# Patient Record
(demographics unavailable — no encounter records)

---

## 2024-12-18 NOTE — HISTORY OF PRESENT ILLNESS
[FreeTextEntry1] : The patient is here for a routine gynecological examination with Pap smear.  Her LMP was 2 weeks ago  She stopped the pill three months ago due to increasing headaches.     She has no recent gynecological or urinary problems

## 2024-12-18 NOTE — PHYSICAL EXAM
[Chaperone Present] : A chaperone was present in the examining room during all aspects of the physical examination [[id number]] : A chaperone was present during the pelvic exam. [Appropriately responsive] : appropriately responsive [Alert] : alert [No Acute Distress] : no acute distress [No Lymphadenopathy] : no lymphadenopathy [Regular Rate Rhythm] : regular rate rhythm [No Murmurs] : no murmurs [Clear to Auscultation B/L] : clear to auscultation bilaterally [Soft] : soft [Non-tender] : non-tender [Non-distended] : non-distended [No HSM] : No HSM [No Lesions] : no lesions [No Mass] : no mass [Oriented x3] : oriented x3 [Labia Majora] : normal [Labia Minora] : normal [Normal] : normal [Uterine Adnexae] : normal

## 2025-02-20 NOTE — REASON FOR VISIT
[FreeTextEntry2] : New Patient-left knee pain with a tear sound when bending her knee  she has a known osteochondroma

## 2025-02-20 NOTE — PHYSICAL EXAM
[Left] : left knee [NL (0)] : extension 0 degrees [5___] : hamstring 5[unfilled]/5 [] : no atrophy [FreeTextEntry8] : osteochondroma palpated medial proximal tibia  [TWNoteComboBox7] : flexion 125 degrees

## 2025-02-20 NOTE — HISTORY OF PRESENT ILLNESS
[7] : 7 [0] : 0 [Dull/Aching] : dull/aching [Constant] : constant [Leisure] : leisure [Full time] : Work status: full time [de-identified] : Left knee pain that has been progressively getting worse for the past year. No new injury. [] : no [FreeTextEntry1] : e [FreeTextEntry9] : stretching [de-identified] : driving

## 2025-02-20 NOTE — DISCUSSION/SUMMARY
[de-identified] : General Dx Discussion The patient was advised of the diagnosis. The natural history of the pathology was explained in full to the patient in layman's terms. All questions were answered. The risks and benefits of surgical and non-surgical treatment alternatives were explained in full to the patient.  Case Discussed. Will get an mri for further evaluation of plica/osteochondroma. .  Also recommend Mobic.  Advised not to take any other nsaids while taking. f/u after mri.   Patient was given a prescription for an anti-inflammatory medication.  They will take it for the next 5-7 days and then on an as needed basis, as long as there are no medical contra-indications.  Patient is counseled on possible GI and blood pressure side effects.    Entered by Susan COOPER acting as a scribe. Instructions: Dr. Castillo- The documentation recorded by the scribe accurately reflects the service I personally performed and the decisions made by me.

## 2025-02-20 NOTE — IMAGING
[Right] : right knee [All Views] : anteroposterior, lateral, skyline, and anteroposterior standing [FreeTextEntry9] : osteochondroma proximal medial tibia

## 2025-02-27 NOTE — HISTORY OF PRESENT ILLNESS
[7] : 7 [0] : 0 [Dull/Aching] : dull/aching [Constant] : constant [Leisure] : leisure [Full time] : Work status: full time [de-identified] : 02/27/2025: Patient is here for MRI results of her left knee. She feels the same.  Some relief with mobic.  [] : no [FreeTextEntry1] : left knee [FreeTextEntry9] : stretching [de-identified] : driving [de-identified] : Dr. Castillo

## 2025-02-27 NOTE — DISCUSSION/SUMMARY
[de-identified] : General Dx Discussion The patient was advised of the diagnosis. The natural history of the pathology was explained in full to the patient in layman's terms. All questions were answered. The risks and benefits of surgical and non-surgical treatment alternatives were explained in full to the patient.  Case Discussed. MRI reviewed in detail with patient. Would recommend surgical excision at this time.  Therefore will refer to Dr. Mckniney for further evaluation and discussion of surgery.    Entered by Susan COOPER acting as a scribe. Instructions: Dr. Castillo- The documentation recorded by the scribe accurately reflects the service I personally performed and the decisions made by me.

## 2025-02-27 NOTE — DATA REVIEWED
[MRI] : MRI [Left] : left [Knee] : knee [Report was reviewed and noted in the chart] : The report was reviewed and noted in the chart [FreeTextEntry1] : mild patchy marrow edema throughout a focal area of bony exostosis in the peripheral medial tibial metaphysis measuring 2.6cm at the insertion of the distal medial collateral ligament without surrounding soft tissue mass. no meniscal tear, chondral defect or loose body.

## 2025-03-26 NOTE — BEGINNING OF VISIT
Problem: At Risk for Falls  Goal: # Patient does not fall  Outcome: Outcome Not Met, Continue to Monitor  Goal: # Takes action to control fall-related risks  Outcome: Outcome Not Met, Continue to Monitor  Goal: # Verbalizes understanding of fall risk/precautions  Description: Document education using the patient education activity  Outcome: Outcome Not Met, Continue to Monitor     Problem: At Risk for Injury Due to Fall  Goal: # Patient does not fall  Outcome: Outcome Not Met, Continue to Monitor  Goal: # Takes action to control condition specific risks  Outcome: Outcome Not Met, Continue to Monitor  Goal: # Verbalizes understanding of fall-related injury personal risks  Description: Document education using the patient education activity  Outcome: Outcome Not Met, Continue to Monitor     Problem: Self Care Deficit  Goal: # Functional status is maintained or returned to baseline - REHAB ONLY  Outcome: Outcome Not Met, Continue to Monitor  Goal: Functional status is maintained or returned to baseline  Outcome: Outcome Not Met, Continue to Monitor  Goal: # Tolerates activity for d/c setting with no clinical problems  Outcome: Outcome Not Met, Continue to Monitor     Problem: Impaired Physical Mobility  Goal: # Bed mobility, ambulation, and ADLs are maintained or returned to baseline during hospitalization  Outcome: Outcome Not Met, Continue to Monitor      [Patient] : patient

## 2025-03-27 NOTE — CONSULT LETTER
[Dear  ___] : Dear  [unfilled], [FreeTextEntry2] : Dr. Skip Castillo [address] [facility], [address] [place] [address] [FreeTextEntry1] : After evaluating your patient and reviewing the studies presented we have come to a mutually agreeable plan.   Please see my note below.  Should you have further questions, please feel free to call and discuss the care of your patient.  Thank you for the confidence of your referral.  Sincerely,  Chris Mckinney MD  Chief, Musculoskeletal Oncology  [phone]

## 2025-03-27 NOTE — CONSULT LETTER
[Dear  ___] : Dear  [unfilled], [FreeTextEntry2] : Dr. Skip Castillo [address] [facility], [address] [place] [address] [FreeTextEntry1] : After evaluating your patient and reviewing the studies presented we have come to a mutually agreeable plan.   Please see my note below.  Should you have further questions, please feel free to call and discuss the care of your patient.  Thank you for the confidence of your referral.  Sincerely,  Chris Mckinney MD  Chief, Musculoskeletal Oncology  [phone]

## 2025-03-27 NOTE — END OF VISIT
[FreeTextEntry3] : All medical record entries made by the Scribe were at my, Dr. Chris Mckinney, direction and personally dictated by me on 03/26/2025. I have reviewed the chart and agree that the record accurately reflects my personal performance of the history, physical exam, assessment and plan. I have also personally directed, reviewed, and agreed with the chart.

## 2025-03-27 NOTE — DISCUSSION/SUMMARY
[Surgical risks reviewed] : Surgical risks reviewed [All Questions Answered] : Patient (and family) had all questions answered to an agreeable level of satisfaction [Interested in Proceeding] : Patient (and family) expressed understanding and interest in proceeding with the plan as outlined [de-identified] : Bony exostosis which could be off of the area of the insertion of the deep MCL. This seems somewhat proximal to the hamstring tendons. We discussed taking this off, although I do not think this is the source of her pain. I think she has more patellofemoral problems, for which I recommended anti-inflammatories and stretching. When talking this off, I would need to see the entire MCL and may need to have this attached down to the bone with a suture anchor and should be protected for a little while.  She is going to consider surgery and will get back to me should she wish to pursue this. She may otherwise continue with stretching and anti-inflammatories and follow up with her primary orthopedist as needed.  If imaging or pathology/biopsy was ordered, the patient was told to make an appointment to review findings right after all imaging is completed.   We discussed risks, benefits and alternatives. Rationale of care was reviewed and all questions were answered.

## 2025-03-27 NOTE — HISTORY OF PRESENT ILLNESS
[Worsening] : worsening [___ mths] : [unfilled] month(s) ago [3] : currently ~his/her~ pain is 3 out of 10 [Running] : worsened by running [FreeTextEntry1] : This is a 24 year old woman who went to Dr. Castillo for left knee pain, for which she was worked up and found to have a bony mass at the left proximal tibia. She has known about a bump on the medial side of her left knee for approx. 10 years. Her parents had wanted it out in the past, but she had never taken it out, mostly because of soccer. She has been complaining of a few months of increasing anterior knee pain. She is unable to precisely locate the source of her pain, though points out an area around the superior patella and slightly medial to the patella. She also feels somewhat of a grinding sensation with deep knee bends. She has pain in the anterior knee at times when running over a few miles at a time.

## 2025-03-27 NOTE — DISCUSSION/SUMMARY
[Surgical risks reviewed] : Surgical risks reviewed [All Questions Answered] : Patient (and family) had all questions answered to an agreeable level of satisfaction [Interested in Proceeding] : Patient (and family) expressed understanding and interest in proceeding with the plan as outlined [de-identified] : Bony exostosis which could be off of the area of the insertion of the deep MCL. This seems somewhat proximal to the hamstring tendons. We discussed taking this off, although I do not think this is the source of her pain. I think she has more patellofemoral problems, for which I recommended anti-inflammatories and stretching. When talking this off, I would need to see the entire MCL and may need to have this attached down to the bone with a suture anchor and should be protected for a little while.  She is going to consider surgery and will get back to me should she wish to pursue this. She may otherwise continue with stretching and anti-inflammatories and follow up with her primary orthopedist as needed.  If imaging or pathology/biopsy was ordered, the patient was told to make an appointment to review findings right after all imaging is completed.   We discussed risks, benefits and alternatives. Rationale of care was reviewed and all questions were answered.

## 2025-03-27 NOTE — PHYSICAL EXAM
[General Appearance - Well-Appearing] : Well appearing [General Appearance - Well Nourished] : well nourished [Oriented To Time, Place, And Person] : Oriented to person, place, and time [Sclera] : the sclera and conjunctiva were normal [Neck Cervical Mass (___cm)] : no neck mass was observed [Heart Rate And Rhythm] : heart rate was normal and rhythm regular [] : No respiratory distress [Abdomen Soft] : Soft [Normal Station and Gait] : gait and station were normal [FreeTextEntry1] : On exam there is an obvious bony protuberance over the medial tibia. Just proximal to the pes. She is stable to varus, valgus, and anterior testing. She has mild soft crepitus in the patellofemoral joint with deep knee squats, which is palpable. There is no snapping or deep knee symptoms in the area of the medial mass. ROM is from 0-130 degrees. She has mildly tight hamstrings. Her calves are soft, and she is otherwise neurovascularly intact.

## 2025-03-27 NOTE — ADDENDUM
[FreeTextEntry1] : I, Charles Gonzales, documented this note as a scribe on behalf of Dr. Chris Mckinney on 03/26/2025.
[FreeTextEntry1] : I, Charles Gonzales, documented this note as a scribe on behalf of Dr. Chris Mckinney on 03/26/2025.
26-Dec-2018

## 2025-03-27 NOTE — REVIEW OF SYSTEMS
[Joint Pain] : joint pain [Nl] : Hematologic/Lymphatic [Joint Stiffness] : no joint stiffness [Joint Swelling] : no joint swelling

## 2025-03-27 NOTE — DATA REVIEWED
[Imaging Present] : Present [de-identified] : X-rays taken 2/20/2025, multiple views of the left knee, show a small bony exostosis along the medial side of the left proximal tibia in the area of the pes insertion.  MRI left knee 2/24/2025 IMPRESSION: 1. Mild patchy marrow edema throughout a focal area of bone exostosis in the peripheral medial tibial metaphysis measuring 2.6 cm at the insertion of the distal medial collateral ligament without surrounding soft tissue mass or peripheral enlarged cartilaginous cap. The findings could represent stress reaction in the setting of repetitive stress injury or acute contusion in the setting of acute trauma within the osteochondroma. Clinical correlation and follow up is needed to ensure that there is no underlying aggressive characteristics. Follow up with plain film and clinical follow up is needed. Consider repeat MRI to further evaluate for healing or as clinically indicated. 2. Mild patchy edema in the gastocnemius muscles posteriorly raising the possibility of mild strains, polymyositis, or early muscle denervation without significant atrophy suspected. 3. Slight effusion without meniscal tear, chondral defect, or loose body.

## 2025-03-27 NOTE — DATA REVIEWED
[Imaging Present] : Present [de-identified] : X-rays taken 2/20/2025, multiple views of the left knee, show a small bony exostosis along the medial side of the left proximal tibia in the area of the pes insertion.  MRI left knee 2/24/2025 IMPRESSION: 1. Mild patchy marrow edema throughout a focal area of bone exostosis in the peripheral medial tibial metaphysis measuring 2.6 cm at the insertion of the distal medial collateral ligament without surrounding soft tissue mass or peripheral enlarged cartilaginous cap. The findings could represent stress reaction in the setting of repetitive stress injury or acute contusion in the setting of acute trauma within the osteochondroma. Clinical correlation and follow up is needed to ensure that there is no underlying aggressive characteristics. Follow up with plain film and clinical follow up is needed. Consider repeat MRI to further evaluate for healing or as clinically indicated. 2. Mild patchy edema in the gastocnemius muscles posteriorly raising the possibility of mild strains, polymyositis, or early muscle denervation without significant atrophy suspected. 3. Slight effusion without meniscal tear, chondral defect, or loose body.